# Patient Record
Sex: MALE
[De-identification: names, ages, dates, MRNs, and addresses within clinical notes are randomized per-mention and may not be internally consistent; named-entity substitution may affect disease eponyms.]

---

## 2019-05-29 ENCOUNTER — APPOINTMENT (OUTPATIENT)
Dept: ORTHOPEDIC SURGERY | Facility: CLINIC | Age: 39
End: 2019-05-29
Payer: COMMERCIAL

## 2019-05-29 VITALS — HEIGHT: 71 IN | BODY MASS INDEX: 24.5 KG/M2 | WEIGHT: 175 LBS

## 2019-05-29 DIAGNOSIS — M25.571 PAIN IN RIGHT ANKLE AND JOINTS OF RIGHT FOOT: ICD-10-CM

## 2019-05-29 DIAGNOSIS — M25.572 PAIN IN LEFT ANKLE AND JOINTS OF LEFT FOOT: ICD-10-CM

## 2019-05-29 PROBLEM — Z00.00 ENCOUNTER FOR PREVENTIVE HEALTH EXAMINATION: Status: ACTIVE | Noted: 2019-05-29

## 2019-05-29 PROCEDURE — 99204 OFFICE O/P NEW MOD 45 MIN: CPT

## 2019-05-29 NOTE — PHYSICAL EXAM
[de-identified] : Bilateral foot examination shows higher arch with tight plantar fascia with tenderness. Full range of motion neurovascular exam is normal. [de-identified] : Previous MRIs done at [facility]. on 5/13/2019 her consistent with plantar fasciitis

## 2019-05-29 NOTE — HISTORY OF PRESENT ILLNESS
[FreeTextEntry1] : Location: Abel feet\par Quality: aching, throbbing\par Duration: 2/1/2019\par Context: Atraumatic\par Aggravating Factors: Running, weightbearing, getting out of bed , morning \par Alleviating Factors: Massage,Nsaids\par Associated Symptoms: n/a\par Prior Studies: MRI [facility]\par

## 2019-05-29 NOTE — DISCUSSION/SUMMARY
[de-identified] : Home exercise program should be done. He was told that this could take 12-18 months to resolve. P.r.n. followup